# Patient Record
Sex: FEMALE | Race: BLACK OR AFRICAN AMERICAN | URBAN - METROPOLITAN AREA
[De-identification: names, ages, dates, MRNs, and addresses within clinical notes are randomized per-mention and may not be internally consistent; named-entity substitution may affect disease eponyms.]

---

## 2022-03-02 ENCOUNTER — WEB ENCOUNTER (OUTPATIENT)
Dept: URBAN - METROPOLITAN AREA CLINIC 107 | Facility: CLINIC | Age: 32
End: 2022-03-02

## 2022-03-02 ENCOUNTER — OFFICE VISIT (OUTPATIENT)
Dept: URBAN - METROPOLITAN AREA CLINIC 107 | Facility: CLINIC | Age: 32
End: 2022-03-02
Payer: COMMERCIAL

## 2022-03-02 VITALS
HEART RATE: 54 BPM | TEMPERATURE: 98.2 F | HEIGHT: 62 IN | DIASTOLIC BLOOD PRESSURE: 102 MMHG | SYSTOLIC BLOOD PRESSURE: 135 MMHG | BODY MASS INDEX: 49.13 KG/M2 | RESPIRATION RATE: 18 BRPM | WEIGHT: 267 LBS

## 2022-03-02 DIAGNOSIS — K59.09 CHRONIC CONSTIPATION: ICD-10-CM

## 2022-03-02 DIAGNOSIS — E66.01 OBESITY, CLASS III, BMI 40-49.9 (MORBID OBESITY): ICD-10-CM

## 2022-03-02 PROCEDURE — 99204 OFFICE O/P NEW MOD 45 MIN: CPT | Performed by: INTERNAL MEDICINE

## 2022-03-02 RX ORDER — METFORMIN HYDROCHLORIDE 500 MG/1
1 TABLET WITH A MEAL TABLET, FILM COATED ORAL ONCE A DAY
Status: ACTIVE | COMMUNITY

## 2022-03-02 RX ORDER — PRUCALOPRIDE 2 MG/1
1 TABLET TABLET, FILM COATED ORAL ONCE A DAY
Qty: 30 | Refills: 5 | OUTPATIENT
Start: 2022-03-02 | End: 2022-08-29

## 2022-03-02 RX ORDER — POLYETHYLENE GLYCOL 3350 17 G/17G
1 CAPFUL POWDER, FOR SOLUTION ORAL ONCE A DAY
OUTPATIENT
Start: 2022-03-02

## 2022-03-02 NOTE — HPI-TODAY'S VISIT:
Ms. Mcbride is a 31-year-old -American woman with a history of type 2 diabetes referred by Dr. Melendez for evaluation of constipation. Available labs on 4/5/2021 showed WBC 7.46, hemoglobin 12.7, MCV 79.6, platelets 376, normal basic metabolic panel, normal liver function tests, normal iron studies, vitamin B12 544, TSH 1.27 She reports having "stomach issues" for more than 3 years but for the past year fairly continuous diffuse abdominal pain.  The abdominal pain is mostly in her mid to lower abdomen, waxes and wanes in severity, and may be sharp to cramping in character and improves somewhat after a bowel movement.  She has longstanding constipation described as straining to pass hard stool no more than 3 times per week.  She reports that she had a "colonic" about 6 months ago with improvement in her symptoms.  She has tried Linzess 290 mcg daily in the past without any improvement.  Typically use a combination of over-the-counter laxatives and magnesium for relief of constipation. She is currently scheduled on 4/20/2022 by Dr. Dillon  For a panniculectomy.  She is hoping to improve her constipation prior to surgery.

## 2022-03-17 ENCOUNTER — TELEPHONE ENCOUNTER (OUTPATIENT)
Dept: URBAN - METROPOLITAN AREA CLINIC 113 | Facility: CLINIC | Age: 32
End: 2022-03-17

## 2022-03-17 ENCOUNTER — DASHBOARD ENCOUNTERS (OUTPATIENT)
Age: 32
End: 2022-03-17

## 2022-03-17 PROBLEM — 408512008: Status: ACTIVE | Noted: 2022-03-17

## 2022-03-17 PROBLEM — 236069009: Status: ACTIVE | Noted: 2022-03-17

## 2025-04-10 ENCOUNTER — OFFICE VISIT (OUTPATIENT)
Dept: URBAN - METROPOLITAN AREA CLINIC 113 | Facility: CLINIC | Age: 35
End: 2025-04-10
Payer: COMMERCIAL

## 2025-04-10 DIAGNOSIS — K64.8 INTERNAL HEMORRHOID: ICD-10-CM

## 2025-04-10 DIAGNOSIS — K59.04 CHRONIC IDIOPATHIC CONSTIPATION: ICD-10-CM

## 2025-04-10 PROBLEM — 90458007: Status: ACTIVE | Noted: 2025-04-10

## 2025-04-10 PROBLEM — 82934008: Status: ACTIVE | Noted: 2025-04-10

## 2025-04-10 PROCEDURE — 99214 OFFICE O/P EST MOD 30 MIN: CPT | Performed by: INTERNAL MEDICINE

## 2025-04-10 RX ORDER — METFORMIN HYDROCHLORIDE 500 MG/1
1 TABLET WITH A MEAL TABLET, FILM COATED ORAL ONCE A DAY
Status: ON HOLD | COMMUNITY

## 2025-04-10 RX ORDER — POLYETHYLENE GLYCOL 3350 17 G/DOSE
1 CAPFUL POWDER (GRAM) ORAL ONCE A DAY
Status: ON HOLD | COMMUNITY
Start: 2022-03-02

## 2025-04-10 RX ORDER — TENAPANOR HYDROCHLORIDE 53.2 MG/1
1 TABLET IMMEDIATELY BEFORE MEALS TABLET ORAL TWICE A DAY
Qty: 60 | OUTPATIENT
Start: 2025-04-10

## 2025-04-10 RX ORDER — TELMISARTAN 40 MG/1
TAKE ONE TABLET BY MOUTH ONE TIME DAILY TABLET ORAL
Qty: 90 UNSPECIFIED | Refills: 0 | Status: ACTIVE | COMMUNITY

## 2025-04-10 NOTE — HPI-TODAY'S VISIT:
Ms. Mcbride is a 34-year-old -American woman with a history of type 2 diabetes presenting for follow up regarding IBS.  She was last seen in the office on 3/2/2022 regarding chronic constipation treated with Motegrity 2 mg daily and MiraLAX 1 capful daily.  She reports continued issues with constipation and exacerbation of hemorrhoids.  She typically has small pebble-like bowel movements with increased abdominal bloating and days where she does not have a bowel movement.  She is intermittently taking Linzess and Motegrity due to cost of the Motegrity.  She reports that she feels like she had a "good" bowel movement about 3 weeks ago.  Regarding her hemorrhoids, she has prolapsing and occasional blood per rectum with bowel movement that wax and wane in severity.  She reports that hemorrhoid symptoms have overall been better since Sunday.  She has been soaking in Epsom salts with improvement in hemorrhoid symptoms.

## 2025-06-12 ENCOUNTER — OFFICE VISIT (OUTPATIENT)
Dept: URBAN - METROPOLITAN AREA CLINIC 113 | Facility: CLINIC | Age: 35
End: 2025-06-12

## 2025-06-12 RX ORDER — TELMISARTAN 40 MG/1
TAKE ONE TABLET BY MOUTH ONE TIME DAILY TABLET ORAL
Qty: 90 UNSPECIFIED | Refills: 0 | Status: ACTIVE | COMMUNITY

## 2025-06-12 RX ORDER — METFORMIN HYDROCHLORIDE 500 MG/1
1 TABLET WITH A MEAL TABLET, FILM COATED ORAL ONCE A DAY
Status: ON HOLD | COMMUNITY

## 2025-06-12 RX ORDER — TENAPANOR HYDROCHLORIDE 53.2 MG/1
1 TABLET IMMEDIATELY BEFORE MEALS TABLET ORAL TWICE A DAY
Qty: 60 | Status: ACTIVE | COMMUNITY
Start: 2025-04-10

## 2025-06-12 RX ORDER — POLYETHYLENE GLYCOL 3350 17 G/DOSE
1 CAPFUL POWDER (GRAM) ORAL ONCE A DAY
Status: ON HOLD | COMMUNITY
Start: 2022-03-02